# Patient Record
Sex: MALE | Race: WHITE | NOT HISPANIC OR LATINO | Employment: FULL TIME | URBAN - METROPOLITAN AREA
[De-identification: names, ages, dates, MRNs, and addresses within clinical notes are randomized per-mention and may not be internally consistent; named-entity substitution may affect disease eponyms.]

---

## 2023-04-24 ENCOUNTER — OFFICE VISIT (OUTPATIENT)
Dept: PRIMARY CARE | Facility: CLINIC | Age: 58
End: 2023-04-24
Payer: COMMERCIAL

## 2023-04-24 VITALS
HEIGHT: 72 IN | BODY MASS INDEX: 25.06 KG/M2 | SYSTOLIC BLOOD PRESSURE: 134 MMHG | WEIGHT: 185 LBS | TEMPERATURE: 96.8 F | DIASTOLIC BLOOD PRESSURE: 80 MMHG

## 2023-04-24 DIAGNOSIS — Z12.5 PROSTATE CANCER SCREENING: ICD-10-CM

## 2023-04-24 DIAGNOSIS — I10 PRIMARY HYPERTENSION: Primary | ICD-10-CM

## 2023-04-24 PROCEDURE — 3075F SYST BP GE 130 - 139MM HG: CPT | Performed by: FAMILY MEDICINE

## 2023-04-24 PROCEDURE — 3079F DIAST BP 80-89 MM HG: CPT | Performed by: FAMILY MEDICINE

## 2023-04-24 PROCEDURE — 99213 OFFICE O/P EST LOW 20 MIN: CPT | Performed by: FAMILY MEDICINE

## 2023-04-24 PROCEDURE — 1036F TOBACCO NON-USER: CPT | Performed by: FAMILY MEDICINE

## 2023-04-24 RX ORDER — FERROUS SULFATE 325(65) MG
65 TABLET ORAL
COMMUNITY

## 2023-04-24 RX ORDER — LISINOPRIL 10 MG/1
10 TABLET ORAL DAILY
Qty: 90 TABLET | Refills: 3 | Status: SHIPPED | OUTPATIENT
Start: 2023-04-24 | End: 2024-05-14 | Stop reason: SDUPTHER

## 2023-04-24 RX ORDER — MULTIVITAMIN
1 TABLET ORAL DAILY
COMMUNITY

## 2023-04-24 RX ORDER — LISINOPRIL 10 MG/1
1 TABLET ORAL DAILY
COMMUNITY
Start: 2019-11-04 | End: 2023-04-24 | Stop reason: SDUPTHER

## 2023-04-24 RX ORDER — VIT C/E/ZN/COPPR/LUTEIN/ZEAXAN 250MG-90MG
25 CAPSULE ORAL DAILY
COMMUNITY

## 2023-04-24 ASSESSMENT — PATIENT HEALTH QUESTIONNAIRE - PHQ9
1. LITTLE INTEREST OR PLEASURE IN DOING THINGS: NOT AT ALL
SUM OF ALL RESPONSES TO PHQ9 QUESTIONS 1 AND 2: 0
2. FEELING DOWN, DEPRESSED OR HOPELESS: NOT AT ALL

## 2023-04-24 ASSESSMENT — ENCOUNTER SYMPTOMS: DEPRESSION: 0

## 2023-04-24 NOTE — PROGRESS NOTES
Subjective   Patient ID: [id number]     Darryl Berry is a 57 y.o. male who presents for Follow-up and Med Refill.  HPI  He is here for a recheck on medications.  He is on lisinopril 10 mg daily.   No side effects.  No cough.  No dizziness.    He has not been in in about two years but he has felt well.    No chest pain.  No SOB.    No fatigue.      BP today is 140/80.    Objective     /80 (BP Location: Right arm, Patient Position: Sitting)   Temp 36 °C (96.8 °F) (Skin)   Ht 1.829 m (6')   Wt 83.9 kg (185 lb)   BMI 25.09 kg/m²      Physical Exam  Cardiovascular:      Rate and Rhythm: Regular rhythm.      Heart sounds: Normal heart sounds.   Pulmonary:      Effort: Pulmonary effort is normal.      Breath sounds: Normal breath sounds.   Neurological:      General: No focal deficit present.      Mental Status: He is alert and oriented to person, place, and time.   Psychiatric:         Mood and Affect: Mood normal.         Behavior: Behavior normal.         Assessment/Plan   Problem List Items Addressed This Visit    None  Visit Diagnoses       Primary hypertension    -  Primary    Relevant Medications    lisinopril 10 mg tablet    Other Relevant Orders    Urinalysis with Reflex Microscopic    Hemoglobin A1C    Lipid Panel    Thyroid Stimulating Hormone    Comprehensive Metabolic Panel    CBC and Auto Differential    Prostate cancer screening        Relevant Orders    Prostate Specific Antigen          I refilled your medication.  I ordered labs to be done fasting at [address] in [place].  I do recommend that you get a colonoscopy to screen for and possibly prevent colon cancer.  You decline this today.  Let me know if you change your mind.    Ralph Sher, DO

## 2023-04-24 NOTE — PATIENT INSTRUCTIONS
Appointment is Already Scheduled for 8/16 I refilled your medication.  I ordered labs to be done fasting at [address] in [place].  I do recommend that you get a colonoscopy to screen for and possibly prevent colon cancer.  You decline this today.  Let me know if you change your mind.

## 2023-04-25 ENCOUNTER — LAB (OUTPATIENT)
Dept: LAB | Facility: LAB | Age: 58
End: 2023-04-25
Payer: COMMERCIAL

## 2023-04-25 DIAGNOSIS — I10 PRIMARY HYPERTENSION: ICD-10-CM

## 2023-04-25 DIAGNOSIS — Z12.5 PROSTATE CANCER SCREENING: ICD-10-CM

## 2023-04-25 LAB
ALANINE AMINOTRANSFERASE (SGPT) (U/L) IN SER/PLAS: 28 U/L (ref 10–52)
ALBUMIN (G/DL) IN SER/PLAS: 4.4 G/DL (ref 3.4–5)
ALKALINE PHOSPHATASE (U/L) IN SER/PLAS: 60 U/L (ref 33–120)
ANION GAP IN SER/PLAS: 11 MMOL/L (ref 10–20)
APPEARANCE, URINE: CLEAR
ASPARTATE AMINOTRANSFERASE (SGOT) (U/L) IN SER/PLAS: 22 U/L (ref 9–39)
BASOPHILS (10*3/UL) IN BLOOD BY AUTOMATED COUNT: 0.05 X10E9/L (ref 0–0.1)
BASOPHILS/100 LEUKOCYTES IN BLOOD BY AUTOMATED COUNT: 1.5 % (ref 0–2)
BILIRUBIN TOTAL (MG/DL) IN SER/PLAS: 0.6 MG/DL (ref 0–1.2)
BILIRUBIN, URINE: NEGATIVE
BLOOD, URINE: NEGATIVE
CALCIUM (MG/DL) IN SER/PLAS: 9.5 MG/DL (ref 8.6–10.3)
CARBON DIOXIDE, TOTAL (MMOL/L) IN SER/PLAS: 30 MMOL/L (ref 21–32)
CHLORIDE (MMOL/L) IN SER/PLAS: 103 MMOL/L (ref 98–107)
CHOLESTEROL (MG/DL) IN SER/PLAS: 149 MG/DL (ref 0–199)
CHOLESTEROL IN HDL (MG/DL) IN SER/PLAS: 47.7 MG/DL
CHOLESTEROL/HDL RATIO: 3.1
COLOR, URINE: COLORLESS
CREATININE (MG/DL) IN SER/PLAS: 0.96 MG/DL (ref 0.5–1.3)
EOSINOPHILS (10*3/UL) IN BLOOD BY AUTOMATED COUNT: 0.18 X10E9/L (ref 0–0.7)
EOSINOPHILS/100 LEUKOCYTES IN BLOOD BY AUTOMATED COUNT: 5.3 % (ref 0–6)
ERYTHROCYTE DISTRIBUTION WIDTH (RATIO) BY AUTOMATED COUNT: 11.9 % (ref 11.5–14.5)
ERYTHROCYTE MEAN CORPUSCULAR HEMOGLOBIN CONCENTRATION (G/DL) BY AUTOMATED: 33 G/DL (ref 32–36)
ERYTHROCYTE MEAN CORPUSCULAR VOLUME (FL) BY AUTOMATED COUNT: 89 FL (ref 80–100)
ERYTHROCYTES (10*6/UL) IN BLOOD BY AUTOMATED COUNT: 4.92 X10E12/L (ref 4.5–5.9)
ESTIMATED AVERAGE GLUCOSE FOR HBA1C: 100 MG/DL
GFR MALE: >90 ML/MIN/1.73M2
GLUCOSE (MG/DL) IN SER/PLAS: 86 MG/DL (ref 74–99)
GLUCOSE, URINE: NEGATIVE MG/DL
HEMATOCRIT (%) IN BLOOD BY AUTOMATED COUNT: 43.9 % (ref 41–52)
HEMOGLOBIN (G/DL) IN BLOOD: 14.5 G/DL (ref 13.5–17.5)
HEMOGLOBIN A1C/HEMOGLOBIN TOTAL IN BLOOD: 5.1 %
IMMATURE GRANULOCYTES/100 LEUKOCYTES IN BLOOD BY AUTOMATED COUNT: 0 % (ref 0–0.9)
KETONES, URINE: NEGATIVE MG/DL
LDL: 86 MG/DL (ref 0–99)
LEUKOCYTE ESTERASE, URINE: NEGATIVE
LEUKOCYTES (10*3/UL) IN BLOOD BY AUTOMATED COUNT: 3.4 X10E9/L (ref 4.4–11.3)
LYMPHOCYTES (10*3/UL) IN BLOOD BY AUTOMATED COUNT: 0.87 X10E9/L (ref 1.2–4.8)
LYMPHOCYTES/100 LEUKOCYTES IN BLOOD BY AUTOMATED COUNT: 25.8 % (ref 13–44)
MONOCYTES (10*3/UL) IN BLOOD BY AUTOMATED COUNT: 0.34 X10E9/L (ref 0.1–1)
MONOCYTES/100 LEUKOCYTES IN BLOOD BY AUTOMATED COUNT: 10.1 % (ref 2–10)
NEUTROPHILS (10*3/UL) IN BLOOD BY AUTOMATED COUNT: 1.93 X10E9/L (ref 1.2–7.7)
NEUTROPHILS/100 LEUKOCYTES IN BLOOD BY AUTOMATED COUNT: 57.3 % (ref 40–80)
NITRITE, URINE: NEGATIVE
PH, URINE: 8 (ref 5–8)
PLATELETS (10*3/UL) IN BLOOD AUTOMATED COUNT: 196 X10E9/L (ref 150–450)
POTASSIUM (MMOL/L) IN SER/PLAS: 4.2 MMOL/L (ref 3.5–5.3)
PROSTATE SPECIFIC AG (NG/ML) IN SER/PLAS: 0.34 NG/ML (ref 0–4)
PROTEIN TOTAL: 7.7 G/DL (ref 6.4–8.2)
PROTEIN, URINE: NEGATIVE MG/DL
SODIUM (MMOL/L) IN SER/PLAS: 140 MMOL/L (ref 136–145)
SPECIFIC GRAVITY, URINE: 1 (ref 1–1.03)
THYROTROPIN (MIU/L) IN SER/PLAS BY DETECTION LIMIT <= 0.05 MIU/L: 2.16 MIU/L (ref 0.44–3.98)
TRIGLYCERIDE (MG/DL) IN SER/PLAS: 76 MG/DL (ref 0–149)
UREA NITROGEN (MG/DL) IN SER/PLAS: 18 MG/DL (ref 6–23)
UROBILINOGEN, URINE: <2 MG/DL (ref 0–1.9)
VLDL: 15 MG/DL (ref 0–40)

## 2023-04-25 PROCEDURE — 85025 COMPLETE CBC W/AUTO DIFF WBC: CPT

## 2023-04-25 PROCEDURE — 80053 COMPREHEN METABOLIC PANEL: CPT

## 2023-04-25 PROCEDURE — 83036 HEMOGLOBIN GLYCOSYLATED A1C: CPT

## 2023-04-25 PROCEDURE — 81003 URINALYSIS AUTO W/O SCOPE: CPT

## 2023-04-25 PROCEDURE — 84153 ASSAY OF PSA TOTAL: CPT

## 2023-04-25 PROCEDURE — 84443 ASSAY THYROID STIM HORMONE: CPT

## 2023-04-25 PROCEDURE — 80061 LIPID PANEL: CPT

## 2023-04-25 PROCEDURE — 36415 COLL VENOUS BLD VENIPUNCTURE: CPT

## 2023-04-26 ENCOUNTER — TELEPHONE (OUTPATIENT)
Dept: PRIMARY CARE | Facility: CLINIC | Age: 58
End: 2023-04-26
Payer: COMMERCIAL

## 2023-04-26 NOTE — TELEPHONE ENCOUNTER
----- Message from Ralph Sher DO sent at 4/26/2023  8:04 AM EDT -----  Please let him know his labs showed no significant abnormalities.

## 2024-05-14 ENCOUNTER — OFFICE VISIT (OUTPATIENT)
Dept: PRIMARY CARE | Facility: CLINIC | Age: 59
End: 2024-05-14
Payer: COMMERCIAL

## 2024-05-14 VITALS
DIASTOLIC BLOOD PRESSURE: 70 MMHG | SYSTOLIC BLOOD PRESSURE: 120 MMHG | WEIGHT: 176 LBS | HEIGHT: 72 IN | BODY MASS INDEX: 23.84 KG/M2 | TEMPERATURE: 97.7 F

## 2024-05-14 DIAGNOSIS — K40.90 RIGHT INGUINAL HERNIA: ICD-10-CM

## 2024-05-14 DIAGNOSIS — I10 PRIMARY HYPERTENSION: Primary | ICD-10-CM

## 2024-05-14 PROCEDURE — 3078F DIAST BP <80 MM HG: CPT | Performed by: FAMILY MEDICINE

## 2024-05-14 PROCEDURE — 1036F TOBACCO NON-USER: CPT | Performed by: FAMILY MEDICINE

## 2024-05-14 PROCEDURE — 99214 OFFICE O/P EST MOD 30 MIN: CPT | Performed by: FAMILY MEDICINE

## 2024-05-14 PROCEDURE — 3074F SYST BP LT 130 MM HG: CPT | Performed by: FAMILY MEDICINE

## 2024-05-14 RX ORDER — LISINOPRIL 10 MG/1
10 TABLET ORAL DAILY
Qty: 90 TABLET | Refills: 3 | Status: SHIPPED | OUTPATIENT
Start: 2024-05-14

## 2024-05-14 RX ORDER — ASCORBIC ACID 500 MG
500 TABLET ORAL DAILY
COMMUNITY

## 2024-05-14 ASSESSMENT — ENCOUNTER SYMPTOMS
SHORTNESS OF BREATH: 0
DIZZINESS: 0
FEVER: 0
VOMITING: 0
NAUSEA: 0
CONSTIPATION: 0
COUGH: 0
ABDOMINAL PAIN: 0
FATIGUE: 0
DIARRHEA: 0
DYSURIA: 0

## 2024-05-14 ASSESSMENT — PATIENT HEALTH QUESTIONNAIRE - PHQ9
SUM OF ALL RESPONSES TO PHQ9 QUESTIONS 1 AND 2: 0
1. LITTLE INTEREST OR PLEASURE IN DOING THINGS: NOT AT ALL
2. FEELING DOWN, DEPRESSED OR HOPELESS: NOT AT ALL

## 2024-05-14 NOTE — PATIENT INSTRUCTIONS
I refilled your lisinopril.  I ordered a general surgery referral to treat your right inguinal hernia.  I recommend no lifting over twenty pounds.  Return or go to the ER if you have increased pain at the hernia, fevers, vomiting with constipation or any concerning symptom associated with the right inguinal hernia.

## 2024-05-14 NOTE — PROGRESS NOTES
Subjective   Patient ID: [id number]     Darryl Berry is a 58 y.o. male who presents for Follow-up (Blood pressure check.), Med Refill, and Mass (Right groin area.).  HPI  He is here for a recheck.  He is on lisinopril for high blood pressure.      BP today is 120/70.      He complains of a mass in his right groin area. He has had a scrotal ultrasound in 2021 showing a moderate right hydrocele and several epididymal cysts.    He has had a chest cold with coughing.  It was bad for two days two weeks ago.  It has improved.  He had some bad coughing and he has felt a buldge in the right inguinal area and he is concerned about a hernia.  No pain, just a bulge out of place.    Review of Systems   Constitutional:  Negative for fatigue and fever.   Respiratory:  Negative for cough and shortness of breath.    Cardiovascular:  Negative for chest pain.   Gastrointestinal:  Negative for abdominal pain, constipation, diarrhea, nausea and vomiting.   Genitourinary:  Negative for dysuria, scrotal swelling and testicular pain.   Neurological:  Negative for dizziness.                 Objective     /70 (BP Location: Left arm, Patient Position: Sitting)   Temp 36.5 °C (97.7 °F) (Skin)   Ht 1.829 m (6')   Wt 79.8 kg (176 lb)   BMI 23.87 kg/m²      Physical Exam  Constitutional:       Appearance: Normal appearance.   Cardiovascular:      Rate and Rhythm: Normal rate and regular rhythm.      Heart sounds: Normal heart sounds. No murmur heard.  Pulmonary:      Effort: Pulmonary effort is normal. No respiratory distress.      Breath sounds: Normal breath sounds.   Abdominal:      General: Abdomen is flat. Bowel sounds are normal.      Palpations: Abdomen is soft.      Tenderness: There is no abdominal tenderness. There is no guarding or rebound.      Hernia: A hernia (right inguinal hernia.  soft.  nontender.  protrudes with cough.) is present.   Genitourinary:     Comments: No testicle mass or tenderness.  Neurological:       General: No focal deficit present.      Mental Status: He is alert and oriented to person, place, and time.         Assessment/Plan   Problem List Items Addressed This Visit    None  Visit Diagnoses       Primary hypertension    -  Primary    Right inguinal hernia            I refilled your lisinopril.  I ordered a general surgery referral to treat your right inguinal hernia.  I recommend no lifting over twenty pounds.  Return or go to the ER if you have increased pain at the hernia, fevers, vomiting with constipation or any concerning symptom associated with the right inguinal hernia.      Ralph Sher, DO

## 2024-05-16 ENCOUNTER — OFFICE VISIT (OUTPATIENT)
Dept: SURGERY | Facility: CLINIC | Age: 59
End: 2024-05-16
Payer: COMMERCIAL

## 2024-05-16 VITALS
BODY MASS INDEX: 23.76 KG/M2 | DIASTOLIC BLOOD PRESSURE: 83 MMHG | OXYGEN SATURATION: 100 % | SYSTOLIC BLOOD PRESSURE: 170 MMHG | HEART RATE: 69 BPM | HEIGHT: 72 IN | RESPIRATION RATE: 16 BRPM | WEIGHT: 175.4 LBS | TEMPERATURE: 96 F

## 2024-05-16 DIAGNOSIS — K40.90 RIGHT INGUINAL HERNIA: Primary | ICD-10-CM

## 2024-05-16 PROCEDURE — 99204 OFFICE O/P NEW MOD 45 MIN: CPT | Performed by: SURGERY

## 2024-05-16 RX ORDER — UBIDECARENONE 75 MG
500 CAPSULE ORAL DAILY
COMMUNITY

## 2024-05-16 NOTE — PROGRESS NOTES
Darryl WAY Berry   [id number]   1965         Chief Complaint/      Right inguinal hernia            HPI/    58-year-old male seen for right inguinal hernia    Patient does not know how long he has had the hernia but has noticed it over the last several months.  Says the bulge in his right groin that pops out and extends into his scrotum.  He can usually push it without difficulty    Denies any issues on the left    Past Medical History:   Diagnosis Date    Hypertension 2020      Hypertension    Denies diabetes, coronary disease, stroke      Social History     Socioeconomic History    Marital status:      Spouse name: Not on file    Number of children: Not on file    Years of education: Not on file    Highest education level: Not on file   Occupational History    Not on file   Tobacco Use    Smoking status: Never    Smokeless tobacco: Never   Vaping Use    Vaping status: Never Used   Substance and Sexual Activity    Alcohol use: Yes     Comment: No regular weekly consumption. Several beers a month    Drug use: Never    Sexual activity: Yes     Partners: Female     Birth control/protection: Condom Male, Male Sterilization   Other Topics Concern    Not on file   Social History Narrative    Not on file     Social Determinants of Health     Financial Resource Strain: Not on file   Food Insecurity: Not on file   Transportation Needs: Not on file   Physical Activity: Not on file   Stress: Not on file   Social Connections: Not on file   Intimate Partner Violence: Not on file   Housing Stability: Not on file   Non-smoker      Family History   Problem Relation Name Age of Onset    No Known Problems Mother      No Known Problems Father          Review of Systems       Current Outpatient Medications:     ascorbic acid (Vitamin C) 500 mg tablet, Take 1 tablet (500 mg) by mouth once daily., Disp: , Rfl:     cholecalciferol (Vitamin D-3) 25 MCG (1000 UT) capsule, Take 1 capsule (25 mcg) by mouth once daily., Disp: , Rfl:      ferrous sulfate 325 (65 Fe) MG tablet, Take 1 tablet by mouth once daily with breakfast., Disp: , Rfl:     lisinopril 10 mg tablet, Take 1 tablet (10 mg) by mouth once daily., Disp: 90 tablet, Rfl: 3    multivitamin tablet, Take 1 tablet by mouth once daily., Disp: , Rfl:     cyanocobalamin (Vitamin B-12) 500 mcg tablet, Take 1 tablet (500 mcg) by mouth once daily., Disp: , Rfl:        no      Xaralto  no      Eliquis  no      Coumadin  no      Plavix        No Known Allergies     US SCROTUM WITH DOPPLER  MRN: [id number]  Patient Name: SHANNA FARMER     STUDY:  US SCROTUM WITH DOPPLERS;  10/18/2021 5:41 pm     INDICATION:  right testicle posterior fullness and pain vs epididymis fullness and  pain.  N50.819: Persistent pain in testicle.     COMPARISON:  None.     ACCESSION NUMBER(S):  [id number]     ORDERING CLINICIAN:  BRYANT GONZALEZ     TECHNIQUE:  Multiple ultrasonographic images of scrotum and tested were obtained.  Color Doppler imaging and waveform analysis was also performed.     FINDINGS:  RIGHT HEMISCROTUM:     RIGHT TESTICLE:  The right testicle measures 4.2 x 2.2 x 3.2 cm.  The right testicle  demonstrates a homogeneous echotexture and normal contour.   There is  a moderate hydrocele with low-level echogenicity indicating debris.  Normal vascularity and Doppler waveforms are observed in the right  testicle.     RIGHT EPIDIDYMIS:  The right epididymis measures 1 x 1.1 x 1.2cm , with an approximate 3  mm epididymal cyst.     LEFT HEMISCROTUM:     LEFT TESTICLE:  The left testicle measures 5.1 x 2.8 x 4 cm.  The left testicle  demonstrates a homogeneous echotexture and normal contour.  There is  a small hydrocele, also with some debris. Normal vascularity and  Doppler waveforms are observed in the left testicle.     LEFT EPIDIDYMIS:  The left epididymis measures 1.2 x 1.1 x 1.3cm , with 2 up to 4 x 4  mm cyst at the epididymal head.     Pampiniform plexus:  No evidence of varicoceles      IMPRESSION:  Moderate right and small left hydroceles containing debris.  Several epididymal cysts. Otherwise unremarkable.       I have reviewed the images and the reports      Laboratory data:   CBC:   Lab Results   Component Value Date    WBC 3.4 (L) 04/25/2023    RBC 4.92 04/25/2023    HGB 14.5 04/25/2023    HCT 43.9 04/25/2023     04/25/2023   ]   [facility] Labs:   Lab Results   Component Value Date     04/25/2023    K 4.2 04/25/2023     04/25/2023    CO2 30 04/25/2023    BUN 18 04/25/2023    CREATININE 0.96 04/25/2023    CALCIUM 9.5 04/25/2023    PROT 7.7 04/25/2023    ALBUMIN 4.4 04/25/2023    BILITOT 0.6 04/25/2023    ALKPHOS 60 04/25/2023    AST 22 04/25/2023    ALT 28 04/25/2023          I have reviewed the above laboratory studies      /83 (BP Location: Right arm, Patient Position: Sitting, BP Cuff Size: Adult)   Pulse 69   Temp 35.6 °C (96 °F) (Temporal)   Resp 16   Ht 1.829 m (6')   Wt 79.6 kg (175 lb 6.4 oz)   SpO2 100%   BMI 23.79 kg/m²        Physical Exam  Constitutional:       Appearance: Normal appearance.   HENT:      Head: Normocephalic and atraumatic.   Cardiovascular:      Rate and Rhythm: Normal rate and regular rhythm.   Pulmonary:      Effort: Pulmonary effort is normal.      Breath sounds: Normal breath sounds.   Abdominal:      Comments: Right inguinal hernia noted, the visible, extends into the upper scrotum, reduces    Mild swelling of right testicle noted    No left inguinal hernia   Musculoskeletal:      Cervical back: Normal range of motion.   Skin:     General: Skin is warm.   Neurological:      General: No focal deficit present.      Mental Status: He is alert and oriented to person, place, and time.                  Assessment/    Right inguinal hernia    Right hydrocele    Baseline health issues to include hypertension          Plan/    Patient has 2 issues in the right groin, 1 being the hernia and the second being the hydrocele.    Patient states  that the hernia is becoming symptomatic and he wants to have it fixed.  He denies any testicular pain.    I told the patient that we could see urology.  If hydrocelectomy or other treatments needed could be accomplished at the same time as hernia repair.  Patient states he does not want to have any testicular surgery done as it is not hurting him.  He does want to have the hernia fixed    Plan will be to proceed to open repair of right inguinal hernia.  Again the hydrocele will not be addressed at this point in time per his request    Open repair of the inguinal hernia is reviewed.    The potential of bleeding, infection, MI, PE/DVT, death, etc. reviewed.  The anticipated convalescence is reviewed.  Use of mesh and prosthetic materials is reviewed.  The plan for postoperative pain management is reviewed.  All questions were answered and consent was obtained

## 2024-06-12 ENCOUNTER — LAB (OUTPATIENT)
Dept: LAB | Facility: LAB | Age: 59
End: 2024-06-12
Payer: COMMERCIAL

## 2024-06-12 ENCOUNTER — PRE-ADMISSION TESTING (OUTPATIENT)
Dept: PREADMISSION TESTING | Facility: HOSPITAL | Age: 59
End: 2024-06-12
Payer: COMMERCIAL

## 2024-06-12 VITALS
SYSTOLIC BLOOD PRESSURE: 141 MMHG | WEIGHT: 171.3 LBS | TEMPERATURE: 97.7 F | DIASTOLIC BLOOD PRESSURE: 82 MMHG | BODY MASS INDEX: 23.2 KG/M2 | HEIGHT: 72 IN | RESPIRATION RATE: 16 BRPM | OXYGEN SATURATION: 100 % | HEART RATE: 73 BPM

## 2024-06-12 DIAGNOSIS — Z01.818 PRE-OP TESTING: Primary | ICD-10-CM

## 2024-06-12 DIAGNOSIS — K40.90 RIGHT INGUINAL HERNIA: ICD-10-CM

## 2024-06-12 LAB
ANION GAP SERPL CALC-SCNC: 11 MMOL/L (ref 10–20)
BUN SERPL-MCNC: 20 MG/DL (ref 6–23)
CALCIUM SERPL-MCNC: 9.7 MG/DL (ref 8.6–10.3)
CHLORIDE SERPL-SCNC: 103 MMOL/L (ref 98–107)
CO2 SERPL-SCNC: 31 MMOL/L (ref 21–32)
CREAT SERPL-MCNC: 0.91 MG/DL (ref 0.5–1.3)
EGFRCR SERPLBLD CKD-EPI 2021: >90 ML/MIN/1.73M*2
ERYTHROCYTE [DISTWIDTH] IN BLOOD BY AUTOMATED COUNT: 11.9 % (ref 11.5–14.5)
GLUCOSE SERPL-MCNC: 88 MG/DL (ref 74–99)
HCT VFR BLD AUTO: 39.7 % (ref 41–52)
HGB BLD-MCNC: 13.8 G/DL (ref 13.5–17.5)
MCH RBC QN AUTO: 28.8 PG (ref 26–34)
MCHC RBC AUTO-ENTMCNC: 34.8 G/DL (ref 32–36)
MCV RBC AUTO: 83 FL (ref 80–100)
NRBC BLD-RTO: 0 /100 WBCS (ref 0–0)
PLATELET # BLD AUTO: 199 X10*3/UL (ref 150–450)
POTASSIUM SERPL-SCNC: 4.8 MMOL/L (ref 3.5–5.3)
RBC # BLD AUTO: 4.8 X10*6/UL (ref 4.5–5.9)
SODIUM SERPL-SCNC: 140 MMOL/L (ref 136–145)
WBC # BLD AUTO: 4.9 X10*3/UL (ref 4.4–11.3)

## 2024-06-12 PROCEDURE — 80048 BASIC METABOLIC PNL TOTAL CA: CPT

## 2024-06-12 PROCEDURE — 36415 COLL VENOUS BLD VENIPUNCTURE: CPT

## 2024-06-12 PROCEDURE — 93005 ELECTROCARDIOGRAM TRACING: CPT

## 2024-06-12 PROCEDURE — 85027 COMPLETE CBC AUTOMATED: CPT

## 2024-06-12 PROCEDURE — 87081 CULTURE SCREEN ONLY: CPT | Mod: STJLAB

## 2024-06-12 RX ORDER — CHLORHEXIDINE GLUCONATE ORAL RINSE 1.2 MG/ML
SOLUTION DENTAL
Qty: 473 ML | Refills: 0 | Status: SHIPPED | OUTPATIENT
Start: 2024-06-12

## 2024-06-12 ASSESSMENT — DUKE ACTIVITY SCORE INDEX (DASI)
CAN YOU CLIMB A FLIGHT OF STAIRS OR WALK UP A HILL: YES
TOTAL_SCORE: 58.2
CAN YOU DO LIGHT WORK AROUND THE HOUSE LIKE DUSTING OR WASHING DISHES: YES
CAN YOU HAVE SEXUAL RELATIONS: YES
CAN YOU WALK A BLOCK OR TWO ON LEVEL GROUND: YES
CAN YOU DO HEAVY WORK AROUND THE HOUSE LIKE SCRUBBING FLOORS OR LIFTING AND MOVING HEAVY FURNITURE: YES
DASI METS SCORE: 9.9
CAN YOU WALK INDOORS, SUCH AS AROUND YOUR HOUSE: YES
CAN YOU DO YARD WORK LIKE RAKING LEAVES, WEEDING OR PUSHING A MOWER: YES
CAN YOU RUN A SHORT DISTANCE: YES
CAN YOU PARTICIPATE IN STRENOUS SPORTS LIKE SWIMMING, SINGLES TENNIS, FOOTBALL, BASKETBALL, OR SKIING: YES
CAN YOU DO MODERATE WORK AROUND THE HOUSE LIKE VACUUMING, SWEEPING FLOORS OR CARRYING GROCERIES: YES
CAN YOU TAKE CARE OF YOURSELF (EAT, DRESS, BATHE, OR USE TOILET): YES
CAN YOU PARTICIPATE IN MODERATE RECREATIONAL ACTIVITIES LIKE GOLF, BOWLING, DANCING, DOUBLES TENNIS OR THROWING A BASEBALL OR FOOTBALL: YES

## 2024-06-12 ASSESSMENT — ACTIVITIES OF DAILY LIVING (ADL): ADL_SCORE: 0

## 2024-06-12 ASSESSMENT — LIFESTYLE VARIABLES: SMOKING_STATUS: NONSMOKER

## 2024-06-12 ASSESSMENT — PAIN - FUNCTIONAL ASSESSMENT: PAIN_FUNCTIONAL_ASSESSMENT: 0-10

## 2024-06-12 NOTE — PREPROCEDURE INSTRUCTIONS
Medication List            Accurate as of June 12, 2024  1:35 PM. Always use your most recent med list.                ascorbic acid 500 mg tablet  Commonly known as: Vitamin C  Medication Adjustments for Surgery: Stop 7 days before surgery     chlorhexidine 0.12 % solution  Commonly known as: Peridex  Swish and spit 15 ml for 2 doses, 15mL the night before surgery and 15 ml morning of surgery - swish for 30 seconds -DO NOT SWALLOW, SPIT OUT  Medication Adjustments for Surgery: Other (Comment)  Notes to patient: As indicated     cholecalciferol 25 MCG (1000 UT) capsule  Commonly known as: Vitamin D-3  Medication Adjustments for Surgery: Stop 7 days before surgery     cyanocobalamin 500 mcg tablet  Commonly known as: Vitamin B-12  Medication Adjustments for Surgery: Stop 7 days before surgery     ferrous sulfate (325 mg ferrous sulfate) tablet  Medication Adjustments for Surgery: Stop 7 days before surgery     lisinopril 10 mg tablet  Take 1 tablet (10 mg) by mouth once daily.  Medication Adjustments for Surgery: Other (Comment)  Notes to patient: HOLD any evening dose the night before the day of surgery  HOLD the day of surgery  -should be discontinued for a minimum of 24 hours before surgery     multivitamin tablet  Medication Adjustments for Surgery: Stop 7 days before surgery                                PRE-OPERATIVE INSTRUCTIONS    You will receive notification one business day prior to your procedure to confirm your arrival time. It is important that you answer your phone and/or check your messages during this time. If you do not hear from the surgery center by 5 pm. the day before your procedure, please call [phone].     Please enter the building through the Outpatient entrance and take the elevator off the lobby to the 2nd floor then check in at the Outpatient Surgery desk on the 2nd floor.    INSTRUCTIONS:  Talk to your surgeon for instructions if you should stop your aspirin, blood thinner, or  diabetes medicines.  DO NOT take any multivitamins or over the counter supplements for 7-10 days before surgery.  If not being admitted, you must have an adult immediately available to drive you home after surgery. We also highly recommend you have someone stay with you for the entire day and night of your surgery.  For children having surgery, a parent or legal guardian must accompany them to the surgery center. If this is not possible, please call [phone] to make additional arrangements.  For adults who are unable to consent or make medical decisions for themselves, a legal guardian or Power of  must accompany them to the surgery center. If this is not possible, please call [phone] to make additional arrangements.  Wear comfortable, loose fitting clothing.  All jewelry and piercings must be removed. If you are unable to remove an item or have a dermal piercing, please be sure to tell the nurse when you arrive for surgery.  Nail polish and make-up must be removed.  Avoid smoking or consuming alcohol for 24 hours before surgery.  To help prevent infection, please take a shower/bath and wash your hair the night before and/or morning of surgery (or follow other specific bathing instructions provided).    Preoperative Fasting Guidelines    Why must I stop eating and drinking near surgery time?  With sedation, food or liquid in your stomach can enter your lungs causing serious complications  Increases nausea and vomiting    When do I need to stop eating and drinking before my surgery?  Do not eat any solid food after midnight the night before your surgery/procedure unless otherwise instructed by your surgeon.   You may have up to 13.5 ounces of clear liquid until TWO hours before your instructed arrival time to the hospital.  This includes water, black tea/coffee, (no milk or cream) apple juice, and electrolyte drinks (Gatorade).   You may chew gum until TWO hours before your surgery/procedure      If  applicable, notify your surgeons office immediately of any new skin changes that occur to the surgical limb.      If you have any questions or concerns, please call Pre-Admission Testing at [phone].

## 2024-06-12 NOTE — CPM/PAT H&P
CPM/PAT Evaluation       Name: Darryl Berry (Darryl Berry)  /Age: 1965/58 y.o.     In-Person       Chief Complaint: right inguinal hernia    HPI    PM is a 57 yo male who has noticed a right groin bulge over the last several months. Symptoms of discomfort with working out are occurring and the bulge pops out and extends into his scrotum, it can be pushed back in without issues. Once he was seen by general surgery it was decided that he would be scheduled for right inguinal hernia repair. He denies any bruising or acute pains from the region. Otherwise denies any recent illness, fever/chills, chest pains or shortness of breath.     Past Medical History:   Diagnosis Date    Hydrocele     Hypertension     Inguinal hernia      PCP: Dr. Tang     Past Surgical History:   Procedure Laterality Date    VASECTOMY  2019    WISDOM TOOTH EXTRACTION         Patient  reports being sexually active and has had partner(s) who are female. He reports using the following methods of birth control/protection: Condom Male and Male Sterilization.    Family History   Problem Relation Name Age of Onset    Hypertension Mother      Hypertension Father      Hypertension Brother         No Known Allergies    Prior to Admission medications    Medication Sig Start Date End Date Taking? Authorizing Provider   ascorbic acid (Vitamin C) 500 mg tablet Take 1 tablet (500 mg) by mouth once daily.    Historical Provider, MD   cholecalciferol (Vitamin D-3) 25 MCG (1000 UT) capsule Take 1 capsule (25 mcg) by mouth once daily.    Historical Provider, MD   cyanocobalamin (Vitamin B-12) 500 mcg tablet Take 1 tablet (500 mcg) by mouth once daily.    Historical Provider, MD   ferrous sulfate 325 (65 Fe) MG tablet Take 1 tablet by mouth once daily with breakfast.    Historical Provider, MD   lisinopril 10 mg tablet Take 1 tablet (10 mg) by mouth once daily. 24   Ralph Sher DO   multivitamin tablet Take 1 tablet by mouth once  daily.    Historical Provider, MD GIBSON BASILIO   Constitutional: Negative for fever, chills, or sweats   ENMT: Negative for nasal discharge, congestion, ear pain, mouth pain, throat pain   Respiratory: Negative for cough, wheezing, shortness of breath   Cardiac: Negative for chest pain, dyspnea on exertion, palpitations   Gastrointestinal: Negative for nausea, vomiting, diarrhea, constipation, abdominal pain    Genitourinary: Negative for dysuria, flank pain, frequency, hematuria + right groin bulge from hernia with swelling into scrotum    Musculoskeletal: Negative for decreased ROM, pain, swelling, weakness   Neurological: Negative for dizziness, confusion, headache  Psychiatric: Negative for mood changes   Skin: Negative for itching, rash, ulcer    Hematologic/Lymph: Negative for bruising, easy bleeding  Allergic/Immunologic: Negative itching, sneezing, swelling      Physical Exam  Constitutional:       Appearance: Normal appearance.   HENT:      Head: Normocephalic.      Mouth/Throat:      Mouth: Mucous membranes are moist.   Eyes:      Extraocular Movements: Extraocular movements intact.   Cardiovascular:      Rate and Rhythm: Normal rate and regular rhythm.   Pulmonary:      Effort: Pulmonary effort is normal.      Breath sounds: Normal breath sounds.   Abdominal:      General: Abdomen is flat.      Palpations: Abdomen is soft.      Hernia: A hernia is present. Hernia is present in the right inguinal area.   Musculoskeletal:         General: Normal range of motion.      Cervical back: Normal range of motion.   Skin:     General: Skin is warm and dry.   Neurological:      General: No focal deficit present.      Mental Status: He is alert.   Psychiatric:         Mood and Affect: Mood normal.          PAT AIRWAY:   Airway:     Neck ROM::  Full   Front tooth implant   implants    Anesthesia:  Patient has not had general anesthesia     Visit Vitals  /82   Pulse 73   Temp 36.5 °C (97.7 °F) (Temporal)   Resp  16       DASI Risk Score      Flowsheet Row Most Recent Value   DASI SCORE 58.2   METS Score (Will be calculated only when all the questions are answered) 9.9          Caprini DVT Assessment      Flowsheet Row Most Recent Value   DVT Score 4   Current Status Major surgery planned, including arthroscopic and laproscopic (1-2 hours)   Age 40-59 years   BMI 30 or less          Modified Frailty Index      Flowsheet Row Most Recent Value   Modified Frailty Index Calculator .0909          CHADS2 Stroke Risk  Current as of 2 hours ago        N/A 3 to 100%: High Risk   2 to < 3%: Medium Risk   0 to < 2%: Low Risk     Last Change: N/A          This score determines the patient's risk of having a stroke if the patient has atrial fibrillation.        This score is not applicable to this patient. Components are not calculated.          Revised Cardiac Risk Index    No data to display       Apfel Simplified Score    No data to display       Risk Analysis Index Results This Encounter         6/12/2024  1329             SCHAEFFER Cancer History: Patient does not indicate history of cancer    Total Risk Analysis Index Score Without Cancer: 19    Total Risk Analysis Index Score: 19          Stop Bang Score      Flowsheet Row Most Recent Value   Do you snore loudly? 0   Do you often feel tired or fatigued after your sleep? 0   Has anyone ever observed you stop breathing in your sleep? 0   Do you have or are you being treated for high blood pressure? 1   Recent BMI (Calculated) 23.2   Is BMI greater than 35 kg/m2? 0=No   Age older than 50 years old? 1=Yes   Is your neck circumference greater than 17 inches (Male) or 16 inches (Female)? 0   Gender - Male 1=Yes   STOP-BANG Total Score 3            Assessment and Plan:     58-year-old male scheduled for repair of right inguinal hernia on 6/26/2024 with Dr. Holcomb. Blood work and MRSA ordered- oral chlorahexidine prescribed. EKG shows NSR with v rate of 67 bpm. Otherwise no further orders  indicated.     Anesthesia:  Patient has not had general anesthesia     See risk scores as previously documented.

## 2024-06-14 LAB — STAPHYLOCOCCUS SPEC CULT: NORMAL

## 2024-06-15 LAB
ATRIAL RATE: 67 BPM
P AXIS: 75 DEGREES
P OFFSET: 188 MS
P ONSET: 132 MS
PR INTERVAL: 160 MS
Q ONSET: 212 MS
QRS COUNT: 11 BEATS
QRS DURATION: 108 MS
QT INTERVAL: 390 MS
QTC CALCULATION(BAZETT): 412 MS
QTC FREDERICIA: 404 MS
R AXIS: 85 DEGREES
T AXIS: 64 DEGREES
T OFFSET: 407 MS
VENTRICULAR RATE: 67 BPM

## 2024-06-24 RX ORDER — OXYCODONE AND ACETAMINOPHEN 5; 325 MG/1; MG/1
1 TABLET ORAL EVERY 6 HOURS PRN
Qty: 10 TABLET | Refills: 0 | Status: SHIPPED | OUTPATIENT
Start: 2024-06-24

## 2024-06-24 NOTE — H&P
Darryl Berry   [id number]   1965         Chief Complaint/        Right inguinal hernia          HPI/    Hypertension    Denies diabetes or coronary artery disease    Past Medical History:   Diagnosis Date    Hydrocele     Hypertension 2020    Inguinal hernia             Social History     Socioeconomic History    Marital status:      Spouse name: Not on file    Number of children: Not on file    Years of education: Not on file    Highest education level: Not on file   Occupational History    Not on file   Tobacco Use    Smoking status: Never    Smokeless tobacco: Never   Vaping Use    Vaping status: Never Used   Substance and Sexual Activity    Alcohol use: Yes     Comment: No regular weekly consumption. Several beers a month    Drug use: Never    Sexual activity: Yes     Partners: Female     Birth control/protection: Condom Male, Male Sterilization   Other Topics Concern    Not on file   Social History Narrative    Not on file     Social Determinants of Health     Financial Resource Strain: Not on file   Food Insecurity: Not on file   Transportation Needs: Not on file   Physical Activity: Not on file   Stress: Not on file   Social Connections: Not on file   Intimate Partner Violence: Not on file   Housing Stability: Not on file      Non-smoker    Family History   Problem Relation Name Age of Onset    Hypertension Mother      Hypertension Father      Hypertension Brother          Review of Systems   All other systems reviewed and are negative.       No current facility-administered medications for this encounter.    Current Outpatient Medications:     ascorbic acid (Vitamin C) 500 mg tablet, Take 1 tablet (500 mg) by mouth once daily., Disp: , Rfl:     chlorhexidine (Peridex) 0.12 % solution, Swish and spit 15 ml for 2 doses, 15mL the night before surgery and 15 ml morning of surgery - swish for 30 seconds -DO NOT SWALLOW, SPIT OUT, Disp: 473 mL, Rfl: 0    cholecalciferol (Vitamin D-3) 25 MCG (1000 UT)  "capsule, Take 1 capsule (25 mcg) by mouth once daily., Disp: , Rfl:     cyanocobalamin (Vitamin B-12) 500 mcg tablet, Take 1 tablet (500 mcg) by mouth once daily., Disp: , Rfl:     ferrous sulfate 325 (65 Fe) MG tablet, Take 1 tablet by mouth once daily with breakfast., Disp: , Rfl:     lisinopril 10 mg tablet, Take 1 tablet (10 mg) by mouth once daily., Disp: 90 tablet, Rfl: 3    multivitamin tablet, Take 1 tablet by mouth once daily., Disp: , Rfl:        no      Xaralto  no      Eliquis  no      Coumadin  no      Plavix        No Known Allergies     ECG 12 lead (Ancillary Performed)  Normal sinus rhythm  Normal ECG  No previous ECGs available       I have reviewed the images and the reports      Laboratory data:   CBC:   No results found for: \"WBC\", \"RBC\", \"HGB\", \"HCT\", \"PLT\"]   [facility] Labs:   No results found for: \"GLUF\", \"NA\", \"K\", \"CL\", \"CO2\", \"BUN\", \"CREATININE\", \"CALCIUM\", \"PROT\", \"ALBUMIN\", \"BILITOT\", \"BILIDIR\", \"ALKPHOS\", \"AST\", \"ALT\"       I have reviewed the above laboratory studies      There were no vitals taken for this visit.       Physical Exam  Constitutional:       Appearance: Normal appearance.   HENT:      Head: Normocephalic and atraumatic.   Cardiovascular:      Rate and Rhythm: Regular rhythm.   Pulmonary:      Effort: Pulmonary effort is normal.      Breath sounds: Normal breath sounds.   Abdominal:      Comments: Right inguinal hernia noted, extends into upper scrotum, reduces    Mild swelling of right testicle noted    No left inguinal hernia   Musculoskeletal:      Cervical back: Normal range of motion.   Skin:     General: Skin is warm.   Neurological:      General: No focal deficit present.      Mental Status: He is alert and oriented to person, place, and time.                  Assessment/    Right inguinal hernia    Right hydrocele    Baseline health issues to include hypertension        Plan/    Note made of concomitant right inguinal hernia and hydrocele.    Patient is aware of the " hydrocele.  Potential of dealing with the hydrocele at the same time of the hernia surgery reviewed with the patient.  He is not interested.  Again urology consultation for the hydrocele has been offered the patient is not interested    Will proceed to open repair of the hernia at his convenience    Patient understands that hernia repair will have no impact on the hydrocele    Open repair of the inguinal hernia is reviewed.    The potential of bleeding, infection, MI, PE/DVT, death, etc. reviewed.  The anticipated convalescence is reviewed.  Use of mesh and prosthetic materials is reviewed.  The plan for postoperative pain management is reviewed.  All questions were answered and consent was obtained

## 2024-06-26 ENCOUNTER — ANESTHESIA EVENT (OUTPATIENT)
Dept: OPERATING ROOM | Facility: HOSPITAL | Age: 59
End: 2024-06-26
Payer: COMMERCIAL

## 2024-06-26 ENCOUNTER — HOSPITAL ENCOUNTER (OUTPATIENT)
Facility: HOSPITAL | Age: 59
Setting detail: OUTPATIENT SURGERY
Discharge: HOME | End: 2024-06-26
Attending: SURGERY | Admitting: SURGERY
Payer: COMMERCIAL

## 2024-06-26 ENCOUNTER — ANESTHESIA (OUTPATIENT)
Dept: OPERATING ROOM | Facility: HOSPITAL | Age: 59
End: 2024-06-26
Payer: COMMERCIAL

## 2024-06-26 VITALS
TEMPERATURE: 96.8 F | BODY MASS INDEX: 23.16 KG/M2 | RESPIRATION RATE: 18 BRPM | WEIGHT: 171 LBS | SYSTOLIC BLOOD PRESSURE: 143 MMHG | HEIGHT: 72 IN | OXYGEN SATURATION: 99 % | HEART RATE: 59 BPM | DIASTOLIC BLOOD PRESSURE: 72 MMHG

## 2024-06-26 DIAGNOSIS — K40.90 RIGHT INGUINAL HERNIA: Primary | ICD-10-CM

## 2024-06-26 PROCEDURE — 3600000008 HC OR TIME - EACH INCREMENTAL 1 MINUTE - PROCEDURE LEVEL THREE: Performed by: SURGERY

## 2024-06-26 PROCEDURE — 2780000003 HC OR 278 NO HCPCS: Performed by: SURGERY

## 2024-06-26 PROCEDURE — 49505 PRP I/HERN INIT REDUC >5 YR: CPT | Performed by: SURGERY

## 2024-06-26 PROCEDURE — 2500000004 HC RX 250 GENERAL PHARMACY W/ HCPCS (ALT 636 FOR OP/ED): Performed by: SURGERY

## 2024-06-26 PROCEDURE — 2500000005 HC RX 250 GENERAL PHARMACY W/O HCPCS: Performed by: NURSE ANESTHETIST, CERTIFIED REGISTERED

## 2024-06-26 PROCEDURE — 7100000001 HC RECOVERY ROOM TIME - INITIAL BASE CHARGE: Performed by: SURGERY

## 2024-06-26 PROCEDURE — 3700000001 HC GENERAL ANESTHESIA TIME - INITIAL BASE CHARGE: Performed by: SURGERY

## 2024-06-26 PROCEDURE — 2720000007 HC OR 272 NO HCPCS: Performed by: SURGERY

## 2024-06-26 PROCEDURE — 7100000010 HC PHASE TWO TIME - EACH INCREMENTAL 1 MINUTE: Performed by: SURGERY

## 2024-06-26 PROCEDURE — 3600000003 HC OR TIME - INITIAL BASE CHARGE - PROCEDURE LEVEL THREE: Performed by: SURGERY

## 2024-06-26 PROCEDURE — 3700000002 HC GENERAL ANESTHESIA TIME - EACH INCREMENTAL 1 MINUTE: Performed by: SURGERY

## 2024-06-26 PROCEDURE — 7100000002 HC RECOVERY ROOM TIME - EACH INCREMENTAL 1 MINUTE: Performed by: SURGERY

## 2024-06-26 PROCEDURE — C1781 MESH (IMPLANTABLE): HCPCS | Performed by: SURGERY

## 2024-06-26 PROCEDURE — 2500000004 HC RX 250 GENERAL PHARMACY W/ HCPCS (ALT 636 FOR OP/ED): Performed by: NURSE ANESTHETIST, CERTIFIED REGISTERED

## 2024-06-26 PROCEDURE — 2500000004 HC RX 250 GENERAL PHARMACY W/ HCPCS (ALT 636 FOR OP/ED): Mod: JZ | Performed by: SURGERY

## 2024-06-26 PROCEDURE — 2500000004 HC RX 250 GENERAL PHARMACY W/ HCPCS (ALT 636 FOR OP/ED): Performed by: STUDENT IN AN ORGANIZED HEALTH CARE EDUCATION/TRAINING PROGRAM

## 2024-06-26 PROCEDURE — 96372 THER/PROPH/DIAG INJ SC/IM: CPT | Performed by: SURGERY

## 2024-06-26 PROCEDURE — 7100000009 HC PHASE TWO TIME - INITIAL BASE CHARGE: Performed by: SURGERY

## 2024-06-26 DEVICE — BARD MESH
Type: IMPLANTABLE DEVICE | Site: INGUINAL | Status: FUNCTIONAL
Brand: BARD MESH

## 2024-06-26 RX ORDER — PROPOFOL 10 MG/ML
INJECTION, EMULSION INTRAVENOUS AS NEEDED
Status: DISCONTINUED | OUTPATIENT
Start: 2024-06-26 | End: 2024-06-26

## 2024-06-26 RX ORDER — LABETALOL HYDROCHLORIDE 5 MG/ML
5 INJECTION, SOLUTION INTRAVENOUS ONCE AS NEEDED
Status: ACTIVE | OUTPATIENT
Start: 2024-06-26

## 2024-06-26 RX ORDER — ONDANSETRON HYDROCHLORIDE 2 MG/ML
INJECTION, SOLUTION INTRAVENOUS AS NEEDED
Status: DISCONTINUED | OUTPATIENT
Start: 2024-06-26 | End: 2024-06-26

## 2024-06-26 RX ORDER — ONDANSETRON HYDROCHLORIDE 2 MG/ML
4 INJECTION, SOLUTION INTRAVENOUS ONCE AS NEEDED
Status: ACTIVE | OUTPATIENT
Start: 2024-06-26

## 2024-06-26 RX ORDER — ALBUTEROL SULFATE 0.83 MG/ML
2.5 SOLUTION RESPIRATORY (INHALATION) ONCE AS NEEDED
Status: ACTIVE | OUTPATIENT
Start: 2024-06-26

## 2024-06-26 RX ORDER — OXYCODONE HYDROCHLORIDE 5 MG/1
5 TABLET ORAL EVERY 4 HOURS PRN
Status: ACTIVE | OUTPATIENT
Start: 2024-06-26

## 2024-06-26 RX ORDER — ROCURONIUM BROMIDE 10 MG/ML
INJECTION, SOLUTION INTRAVENOUS AS NEEDED
Status: DISCONTINUED | OUTPATIENT
Start: 2024-06-26 | End: 2024-06-26

## 2024-06-26 RX ORDER — CEFAZOLIN SODIUM 2 G/100ML
2 INJECTION, SOLUTION INTRAVENOUS
Status: COMPLETED | OUTPATIENT
Start: 2024-06-26 | End: 2024-06-26

## 2024-06-26 RX ORDER — SODIUM CHLORIDE, SODIUM LACTATE, POTASSIUM CHLORIDE, CALCIUM CHLORIDE 600; 310; 30; 20 MG/100ML; MG/100ML; MG/100ML; MG/100ML
100 INJECTION, SOLUTION INTRAVENOUS CONTINUOUS
Status: ACTIVE | OUTPATIENT
Start: 2024-06-26

## 2024-06-26 RX ORDER — FENTANYL CITRATE 50 UG/ML
50 INJECTION, SOLUTION INTRAMUSCULAR; INTRAVENOUS EVERY 5 MIN PRN
Status: DISPENSED | OUTPATIENT
Start: 2024-06-26

## 2024-06-26 RX ORDER — LIDOCAINE HYDROCHLORIDE 10 MG/ML
0.1 INJECTION INFILTRATION; PERINEURAL ONCE
Status: ACTIVE | OUTPATIENT
Start: 2024-06-26

## 2024-06-26 RX ORDER — HYDRALAZINE HYDROCHLORIDE 20 MG/ML
5 INJECTION INTRAMUSCULAR; INTRAVENOUS EVERY 30 MIN PRN
Status: ACTIVE | OUTPATIENT
Start: 2024-06-26

## 2024-06-26 RX ORDER — LIDOCAINE HYDROCHLORIDE 20 MG/ML
INJECTION, SOLUTION EPIDURAL; INFILTRATION; INTRACAUDAL; PERINEURAL AS NEEDED
Status: DISCONTINUED | OUTPATIENT
Start: 2024-06-26 | End: 2024-06-26

## 2024-06-26 RX ORDER — MIDAZOLAM HYDROCHLORIDE 1 MG/ML
INJECTION, SOLUTION INTRAMUSCULAR; INTRAVENOUS AS NEEDED
Status: DISCONTINUED | OUTPATIENT
Start: 2024-06-26 | End: 2024-06-26

## 2024-06-26 RX ORDER — FENTANYL CITRATE 50 UG/ML
INJECTION, SOLUTION INTRAMUSCULAR; INTRAVENOUS AS NEEDED
Status: DISCONTINUED | OUTPATIENT
Start: 2024-06-26 | End: 2024-06-26

## 2024-06-26 RX ORDER — HEPARIN SODIUM 5000 [USP'U]/ML
5000 INJECTION, SOLUTION INTRAVENOUS; SUBCUTANEOUS
Status: COMPLETED | OUTPATIENT
Start: 2024-06-26 | End: 2024-06-26

## 2024-06-26 ASSESSMENT — PAIN - FUNCTIONAL ASSESSMENT
PAIN_FUNCTIONAL_ASSESSMENT: 0-10

## 2024-06-26 ASSESSMENT — PAIN SCALES - GENERAL
PAINLEVEL_OUTOF10: 3
PAINLEVEL_OUTOF10: 3
PAINLEVEL_OUTOF10: 4
PAINLEVEL_OUTOF10: 3
PAINLEVEL_OUTOF10: 4
PAINLEVEL_OUTOF10: 5 - MODERATE PAIN
PAINLEVEL_OUTOF10: 0 - NO PAIN

## 2024-06-26 NOTE — ANESTHESIA PROCEDURE NOTES
Airway  Date/Time: 6/26/2024 11:36 AM  Urgency: elective    Airway not difficult    Staffing  Performed: attending   Authorized by: Elisa Covington MD    Performed by: DOMO Aguilar-EDWINA  Patient location during procedure: OR    Indications and Patient Condition  Indications for airway management: anesthesia  Spontaneous ventilation: present  Sedation level: deep  Preoxygenated: yes  Patient position: sniffing  MILS maintained throughout  Mask difficulty assessment: 1 - vent by mask    Final Airway Details  Final airway type: endotracheal airway      Successful airway: ETT  Cuffed: yes   Successful intubation technique: direct laryngoscopy  Facilitating devices/methods: intubating stylet  Endotracheal tube insertion site: oral  Blade: Juan  ETT size (mm): 7.5  Cormack-Lehane Classification: grade I - full view of glottis  Placement verified by: chest auscultation and capnometry   Measured from: lips  Number of attempts at approach: 1  Ventilation between attempts: none  Number of other approaches attempted: 0

## 2024-06-26 NOTE — BRIEF OP NOTE
Date: 2024  OR Location: [facility] OR    Name: Darryl Berry, : 1965, Age: 58 y.o., MRN: [id number], Sex: male    Diagnosis  Pre-op Diagnosis     * Right inguinal hernia [K40.90] Post-op Diagnosis     * Right inguinal hernia [K40.90]     Procedures  Repair of right inguinal hernia  63517 - CA RPR 1ST INGUN HRNA AGE 5 YRS/> REDUCIBLE      Surgeons      * Juan Carlos Holcomb - Primary    Resident/Fellow/Other Assistant:  Surgeons and Role:  * No surgeons found with a matching role *    Procedure Summary  Anesthesia: General  ASA: II  Anesthesia Staff: Anesthesiologist: Elisa Covington MD  CRNA: DOMO Aguilar-CRNA  Estimated Blood Loss: Minimal large indirect sac mL  Intra-op Medications:   Administrations occurring from 1050 to 1215 on 24:   Medication Name Total Dose   ceFAZolin in dextrose (iso-os) (Ancef) IVPB 2 g 2 g              Anesthesia Record               Intraprocedure I/O Totals       None           Specimen:   ID Type Source Tests Collected by Time   1 : HERNIA SAC Tissue HERNIA SAC SURGICAL PATHOLOGY EXAM Juan Carlos Holcomb MD 2024 [id number]        Staff:   Joanneulator: Sherron  Circulator: Julio Valiente Person: Savi          Findings: Large indirect sac    Complications:  None; patient tolerated the procedure well.     Disposition: PACU - hemodynamically stable.  Condition: stable  Specimens Collected:   ID Type Source Tests Collected by Time   1 : HERNIA SAC Tissue HERNIA SAC SURGICAL PATHOLOGY EXAM Juan Carlos Holcomb MD 2024 [id number]     Attending Attestation: I performed the procedure.    Juan Carlos Holcomb  Phone Number: [phone]    used

## 2024-06-26 NOTE — OP NOTE
Repair of right inguinal hernia (R) Operative Note     Date: 2024  OR Location: [facility] OR    Name: Darryl Berry, : 1965, Age: 58 y.o., MRN: [id number], Sex: male    Diagnosis  Pre-op Diagnosis     * Right inguinal hernia [K40.90] Post-op Diagnosis     * Right inguinal hernia [K40.90]     Procedures  Repair of right inguinal hernia  37577 - MD RPR 1ST INGUN HRNA AGE 5 YRS/> REDUCIBLE      Surgeons      * Juan Carlos Holcomb - Primary    Resident/Fellow/Other Assistant:  Surgeons and Role:  * No surgeons found with a matching role *    Procedure Summary  Anesthesia: General  ASA: II  Anesthesia Staff: Anesthesiologist: Elisa Covington MD  CRNA: DOMO Aguilar-CRNA  Estimated Blood Loss: Minimal mL  Intra-op Medications:   Administrations occurring from 1050 to 1215 on 24:   Medication Name Total Dose   ceFAZolin in dextrose (iso-os) (Ancef) IVPB 2 g 2 g              Anesthesia Record               Intraprocedure I/O Totals       None           Specimen:   ID Type Source Tests Collected by Time   1 : HERNIA SAC Tissue HERNIA SAC SURGICAL PATHOLOGY EXAM Juan Carlos Holcomb MD 2024 1159        Staff:   Joanneulator: Sherron  Circulator: Julio Valiente Person: Savi         Drains and/or Catheters: * None in log *    Tourniquet Times:         Implants:  Implants       Type Name Action Serial No.      Surgical Mesh Sling Implant PATCH, MESH, MARLEX, 3 X 6 IN, POLYPROPYLENE - [id number] Implanted          58-year-old male presents for elective repair of right inguinal hernia.    Risk benefits indications for this reviewed with him.  The potential bleeding infection MI PE death etc. reviewed.  The anticipated convalescence is reviewed.  Use of mesh or prosthetic materials was reviewed.  All questions were answered and consent is obtained.    Patient is taken to the operating room placed on the table in supine position.  General esthesia obtained.  Right groin prepped and draped in a sterile fashion.   Timeout procedure followed.  Antibiotics were given.  DVT prophylaxis obtained using subcutaneous heparin and placement of external meta compression stockings    Standard right inguinal hernia incision is made and dissection carried down to the skin, subcutaneous tissues and Meggan's fascia to level the external oblique.  The external bleak was opened in direction of its fibers and the cord was mobilized.    Dissection was carried out there is a large indirect sac running for the entire length of the canal into the upper scrotum.   small cord structures    Note is made there is no significant lipoma.  There is no obvious direct defect    After  the sac from the cord structures it is opened and explored.  There is no sliding or incarcerated component.  Finger wound is injuries to the internal ring and the posterior floor palpated.  Again there is no femoral direct defect sac controlled with suture ligature and the redundant portion excised    A 3 x 6 inch piece of flat polypropylene mesh was brought onto the field.  Sinker the pubic tubercle using interrupted 0 Prolene.  Double-armed 2-0 Prolene is used to further anchor the mesh to the tubercle.  1 arm of the 2-0 Prolene used to anchor the mesh in a running fashion to the iliopubic tract and showing edge of the ligament.  Second arm of the 2-0 Prolene used to anchor the mesh to the lateral edge of the conjoined tendon the superior margin and transversalis arch.  Laterally a transverse slit was created the mesh shows allow passage of the cord and the nerve.  Defect in the mesh was tightened with several interrupted Prolene suture sutures.    Repair was inspected.  Hemostasis is good.  Counts reported as correct.  The external oblique was closed with 2-0 Vicryl.  Skin is closed with 4-0 Vicryl.    Patient tolerated procedure well                    Juan Carlos Holcomb  Phone Number: [phone]       Duration Of Freeze Thaw-Cycle (Seconds): 3 Number Of Freeze-Thaw Cycles: 2 freeze-thaw cycles Show Spray Paint Technique Variable?: Yes Detail Level: Detailed Render Note In Bullet Format When Appropriate: No Spray Paint Text: The liquid nitrogen was applied to the skin utilizing a spray paint frosting technique. Consent: The patient's consent was obtained including but not limited to risks of crusting, scabbing, blistering, scarring, darker or lighter pigmentary change, recurrence, incomplete removal and infection. Post-Care Instructions: I reviewed with the patient in detail post-care instructions. Patient is to wear sunprotection, and avoid picking at any of the treated lesions. Pt may apply Vaseline to crusted or scabbing areas. Medical Necessity Information: It is in your best interest to select a reason for this procedure from the list below. All of these items fulfill various CMS LCD requirements except the new and changing color options. Medical Necessity Clause: This procedure was medically necessary because the lesions that were treated were:

## 2024-06-26 NOTE — ANESTHESIA PREPROCEDURE EVALUATION
Patient: Darryl Berry    Procedure Information       Date/Time: 06/26/24 1050    Procedure: Repair of right inguinal hernia (Right)    Location: [facility] OR 02 / [facility] OR    Surgeons: Juan Carlos Holcomb MD            Relevant Problems   Anesthesia (within normal limits)      Digestive   (+) Right inguinal hernia       Clinical information reviewed:    Allergies  Meds               NPO Detail:  No data recorded     Physical Exam    Airway  Mallampati: II  TM distance: >3 FB  Neck ROM: full     Cardiovascular   Rhythm: regular  Rate: normal     Dental    Pulmonary   Breath sounds clear to auscultation     Abdominal   Abdomen: soft             Anesthesia Plan    History of general anesthesia?: yes  History of complications of general anesthesia?: no    ASA 2     general     intravenous induction   Postoperative administration of opioids is intended.  Anesthetic plan and risks discussed with patient.  Use of blood products discussed with patient who consented to blood products.    Plan discussed with CAA, CRNA and attending.

## 2024-06-26 NOTE — ANESTHESIA POSTPROCEDURE EVALUATION
Patient: Darryl Berry    Procedure Summary       Date: 06/26/24 Room / Location: [facility] OR 02 / [facility] OR    Anesthesia Start: 1127 Anesthesia Stop: 1234    Procedure: Repair of right inguinal hernia (Right: Abdomen) Diagnosis:       Right inguinal hernia      (Right inguinal hernia [K40.90])    Surgeons: Juan Carlos Holcomb MD Responsible Provider: Elisa Covington MD    Anesthesia Type: general ASA Status: 2            Anesthesia Type: general    Vitals Value Taken Time   /79 06/26/24 1330   Temp 36 °C (96.8 °F) 06/26/24 1330   Pulse 48 06/26/24 1333   Resp 26 06/26/24 1333   SpO2 98 % 06/26/24 1333   Vitals shown include unfiled device data.    Anesthesia Post Evaluation    Patient location during evaluation: PACU  Patient participation: complete - patient participated  Level of consciousness: awake and alert  Pain management: adequate  Airway patency: patent  Cardiovascular status: acceptable  Respiratory status: acceptable  Hydration status: acceptable  Postoperative Nausea and Vomiting: none        No notable events documented.

## 2024-07-09 ENCOUNTER — APPOINTMENT (OUTPATIENT)
Dept: SURGERY | Facility: CLINIC | Age: 59
End: 2024-07-09
Payer: COMMERCIAL

## 2024-07-09 VITALS
SYSTOLIC BLOOD PRESSURE: 126 MMHG | HEART RATE: 74 BPM | DIASTOLIC BLOOD PRESSURE: 75 MMHG | RESPIRATION RATE: 16 BRPM | OXYGEN SATURATION: 99 % | TEMPERATURE: 98 F

## 2024-07-09 DIAGNOSIS — K40.90 RIGHT INGUINAL HERNIA: Primary | ICD-10-CM

## 2024-07-09 PROCEDURE — 99024 POSTOP FOLLOW-UP VISIT: CPT | Performed by: SURGERY

## 2024-07-09 NOTE — LETTER
July 9, 2024     Patient: Darryl Berry   YOB: 1965   Date of Visit: 7/9/2024       To Whom It May Concern:    It is my medical opinion that Darryl Berry may return to work on Monday July 15th with restrictions of no more than 15 lbs. May return to full duty on July 29th without restrictions   .    If you have any questions or concerns, please don't hesitate to call.         Sincerely,        Juan Carlos Holcomb MD    CC: No Recipients

## 2024-07-09 NOTE — PROGRESS NOTES
"Darryl Berry   [id number]   1965     Chief Complaint/        Postop check        HPI/    58-year-old male status post 6/26/2024 repair of right inguinal hernia    Patient feels great.  Ambulates easily.  Minimal discomfort.  Performing household chores without difficulty.  GI and  function at baseline    Laboratory data:   CBC:   Lab Results   Component Value Date    WBC 4.9 06/12/2024    RBC 4.80 06/12/2024    HGB 13.8 06/12/2024    HCT 39.7 (L) 06/12/2024     06/12/2024   ]   [facility] Labs:   Lab Results   Component Value Date     06/12/2024    K 4.8 06/12/2024     06/12/2024    CO2 31 06/12/2024    BUN 20 06/12/2024    CREATININE 0.91 06/12/2024    CALCIUM 9.7 06/12/2024    PROT 7.7 04/25/2023    ALBUMIN 4.4 04/25/2023    BILITOT 0.6 04/25/2023    ALKPHOS 60 04/25/2023    AST 22 04/25/2023    ALT 28 04/25/2023      No results found for: \"FINALDX\"    ECG 12 lead (Ancillary Performed)  Normal sinus rhythm  Normal ECG  No previous ECGs available       /75 (BP Location: Right arm, Patient Position: Sitting, BP Cuff Size: Adult)   Pulse 74   Temp 36.7 °C (98 °F) (Temporal)   Resp 16   SpO2 99%      Physical Exam  Abdominal:      Comments: The right groin wound is clean and dry.  No redness drainage or swelling                Assessment/    Doing well      Plan/    Will return to work on light duty basis on Monday    RTC 6 weeks for final check    "

## 2024-07-11 LAB
LABORATORY COMMENT REPORT: NORMAL
PATH REPORT.FINAL DX SPEC: NORMAL
PATH REPORT.GROSS SPEC: NORMAL
PATH REPORT.RELEVANT HX SPEC: NORMAL
PATH REPORT.TOTAL CANCER: NORMAL

## 2024-08-20 ENCOUNTER — APPOINTMENT (OUTPATIENT)
Dept: SURGERY | Facility: CLINIC | Age: 59
End: 2024-08-20
Payer: COMMERCIAL

## 2025-05-05 ENCOUNTER — TELEPHONE (OUTPATIENT)
Dept: PRIMARY CARE | Facility: CLINIC | Age: 60
End: 2025-05-05
Payer: COMMERCIAL

## 2025-05-05 DIAGNOSIS — I10 PRIMARY HYPERTENSION: ICD-10-CM

## 2025-05-05 RX ORDER — LISINOPRIL 10 MG/1
10 TABLET ORAL DAILY
Qty: 90 TABLET | Refills: 3 | Status: SHIPPED | OUTPATIENT
Start: 2025-05-05

## 2025-05-05 NOTE — TELEPHONE ENCOUNTER
Rx Refill Request Telephone Encounter    Name:  Darryl Berry  :  [id number]  Medication Name:  lisinopril  Dose: 10 mg   Route: oral   Frequency: Daily  Dispense Quantity: 90 tablet (90 day supply)   Refills: 3   Duration: --   Dispense As Written: Yes        Sig: Take 1 tablet (10 mg) by mouth once daily.    Last refill: 24    Specific Pharmacy location:  [facility] [address] [place] [address]    Date of last appointment:  24  Date of next appointment:  25  Best number to reach patient:  [phone]

## 2025-08-08 ENCOUNTER — APPOINTMENT (OUTPATIENT)
Dept: PRIMARY CARE | Facility: CLINIC | Age: 60
End: 2025-08-08
Payer: COMMERCIAL

## 2025-08-08 VITALS
BODY MASS INDEX: 23.03 KG/M2 | WEIGHT: 170 LBS | DIASTOLIC BLOOD PRESSURE: 68 MMHG | TEMPERATURE: 97.2 F | SYSTOLIC BLOOD PRESSURE: 118 MMHG | HEIGHT: 72 IN

## 2025-08-08 DIAGNOSIS — Z12.11 ENCOUNTER FOR SCREENING FOR MALIGNANT NEOPLASM OF COLON: ICD-10-CM

## 2025-08-08 DIAGNOSIS — Z00.00 GENERAL MEDICAL EXAM: Primary | ICD-10-CM

## 2025-08-08 PROCEDURE — 3008F BODY MASS INDEX DOCD: CPT | Performed by: FAMILY MEDICINE

## 2025-08-08 PROCEDURE — 99396 PREV VISIT EST AGE 40-64: CPT | Performed by: FAMILY MEDICINE

## 2025-08-08 PROCEDURE — 1036F TOBACCO NON-USER: CPT | Performed by: FAMILY MEDICINE

## 2025-08-08 ASSESSMENT — ENCOUNTER SYMPTOMS
FATIGUE: 0
WEAKNESS: 0
ARTHRALGIAS: 0
FEVER: 0
DIARRHEA: 0
NUMBNESS: 0
SORE THROAT: 0
CHILLS: 0
SLEEP DISTURBANCE: 1
VOICE CHANGE: 0
TROUBLE SWALLOWING: 0
CONSTIPATION: 0
SHORTNESS OF BREATH: 0
VOMITING: 0
MYALGIAS: 0
ABDOMINAL PAIN: 0
COUGH: 0
WOUND: 0
DYSURIA: 0
BLOOD IN STOOL: 0
HEMATURIA: 0
BACK PAIN: 0
HEADACHES: 0
SINUS PAIN: 0
FREQUENCY: 0
DYSPHORIC MOOD: 1
NAUSEA: 0
DIZZINESS: 0
WHEEZING: 0
PALPITATIONS: 0
ROS SKIN COMMENTS: NO MOLES GROWING OR CHANGING.
NERVOUS/ANXIOUS: 1
ADENOPATHY: 0
RHINORRHEA: 0

## 2025-08-08 ASSESSMENT — PROMIS GLOBAL HEALTH SCALE
EMOTIONAL_PROBLEMS: SOMETIMES
RATE_AVERAGE_PAIN: 2
RATE_AVERAGE_FATIGUE: MODERATE
RATE_GENERAL_HEALTH: GOOD
RATE_SOCIAL_SATISFACTION: GOOD
RATE_PHYSICAL_HEALTH: GOOD
CARRYOUT_PHYSICAL_ACTIVITIES: COMPLETELY
RATE_QUALITY_OF_LIFE: GOOD
CARRYOUT_SOCIAL_ACTIVITIES: GOOD
RATE_MENTAL_HEALTH: GOOD

## 2025-08-08 ASSESSMENT — PATIENT HEALTH QUESTIONNAIRE - PHQ9
2. FEELING DOWN, DEPRESSED OR HOPELESS: NOT AT ALL
1. LITTLE INTEREST OR PLEASURE IN DOING THINGS: NOT AT ALL
SUM OF ALL RESPONSES TO PHQ9 QUESTIONS 1 AND 2: 0

## 2025-08-08 NOTE — PATIENT INSTRUCTIONS
I will order labs to be done fasting at [address]  I ordered a Cologuard test as your schedule is too busy to get a colonoscopy, which is the best way to screen for colon cancer.  Return in one year for a general physical and sooner if you have any new or worsen problems. Continue with regular exercise.  You decline medical treatment for depression or anxiety at this time, which is fine.

## 2025-08-08 NOTE — PROGRESS NOTES
"Subjective   Patient ID: [id number]     Darryl Berry is a 59 y.o. male who presents for Annual Exam (Not Fasting) and Varicose Veins (Left calf.).  HPI    He is here for a general medical examination    He remains on lisinopril for HTN.      He had inguinal hernia surgery last summer.  He feels like it is weak in the same area, \"like I am going to have another hernia\".      No surgery or hospitalizations over the last year otherwise.     Medications Ordered Prior to Encounter[1]    Tobacco Use: Low Risk  (8/8/2025)    Patient History     Smoking Tobacco Use: Never     Smokeless Tobacco Use: Never     Passive Exposure: Not on file   Drinks alcohol very occasionally.  No drug use.   Exercises regularly.  Weights six or seven times a week.  Cardio every day.  Job--physically active.    Family History[2]  No family history of cancer or heart disease.    Never had a colonoscopy.       Review of Systems   Constitutional:  Negative for chills, fatigue and fever.   HENT:  Negative for congestion, rhinorrhea, sinus pain, sore throat, trouble swallowing and voice change.    Respiratory:  Negative for cough, shortness of breath and wheezing.    Cardiovascular:  Negative for chest pain, palpitations and leg swelling (significant varicose veins.).   Gastrointestinal:  Negative for abdominal pain, blood in stool, constipation, diarrhea, nausea and vomiting.   Genitourinary:  Negative for dysuria, frequency, hematuria, scrotal swelling and testicular pain.   Musculoskeletal:  Negative for arthralgias, back pain and myalgias.   Skin:  Negative for rash and wound.        No moles growing or changing.   Neurological:  Negative for dizziness, syncope, weakness, numbness and headaches.   Hematological:  Negative for adenopathy.   Psychiatric/Behavioral:  Positive for dysphoric mood (some in general.  \"nothing major\".) and sleep disturbance (occasionally at times.). Negative for self-injury and suicidal ideas. The patient is " "nervous/anxious.          Objective     /68 (BP Location: Left arm, Patient Position: Sitting)   Temp 36.2 °C (97.2 °F) (Skin)   Ht 1.816 m (5' 11.5\")   Wt 77.1 kg (170 lb)   BMI 23.38 kg/m²      Physical Exam  Vitals reviewed.   Constitutional:       General: He is not in acute distress.     Appearance: Normal appearance. He is not ill-appearing or toxic-appearing.   HENT:      Head: Normocephalic and atraumatic.      Right Ear: Tympanic membrane, ear canal and external ear normal.      Left Ear: Tympanic membrane, ear canal and external ear normal.      Nose: Nose normal.      Mouth/Throat:      Mouth: Mucous membranes are moist.     Eyes:      Extraocular Movements: Extraocular movements intact.      Conjunctiva/sclera: Conjunctivae normal.      Pupils: Pupils are equal, round, and reactive to light.       Cardiovascular:      Rate and Rhythm: Normal rate and regular rhythm.      Heart sounds: No murmur heard.  Pulmonary:      Effort: Pulmonary effort is normal.      Breath sounds: Normal breath sounds.   Abdominal:      General: Bowel sounds are normal. There is no distension.      Palpations: Abdomen is soft. There is no mass.      Tenderness: There is no abdominal tenderness. There is no guarding or rebound.     Musculoskeletal:         General: No tenderness.      Cervical back: Neck supple.      Right lower leg: No edema.      Left lower leg: No edema.     Skin:     Coloration: Skin is not jaundiced or pale.      Findings: No rash.     Neurological:      General: No focal deficit present.      Mental Status: He is alert and oriented to person, place, and time. Mental status is at baseline.     Psychiatric:         Mood and Affect: Mood normal.         Behavior: Behavior normal.         Thought Content: Thought content normal.         Judgment: Judgment normal.         Assessment/Plan   Problem List Items Addressed This Visit    None  Visit Diagnoses         General medical exam    -  Primary    " Relevant Orders    Urinalysis with Reflex Microscopic    Hemoglobin A1C    Lipid Panel    Thyroid Stimulating Hormone    Comprehensive Metabolic Panel    CBC and Auto Differential    Prostate Specific Antigen      Encounter for screening for malignant neoplasm of colon        Relevant Orders    Cologuard® colon cancer screening          I will order labs to be done fasting at [address]  I ordered a Cologuard test as your schedule is too busy to get a colonoscopy, which is the best way to screen for colon cancer.  Return in one year for a general physical and sooner if you have any new or worsen problems. Continue with regular exercise.  You decline medical treatment for depression or anxiety at this time, which is fine.   Ralph Sher DO          [1]   Current Outpatient Medications on File Prior to Visit   Medication Sig Dispense Refill    ascorbic acid (Vitamin C) 500 mg tablet Take 1 tablet (500 mg) by mouth once daily.      cholecalciferol (Vitamin D-3) 25 MCG (1000 UT) capsule Take 1 capsule (25 mcg) by mouth once daily.      cyanocobalamin (Vitamin B-12) 500 mcg tablet Take 1 tablet (500 mcg) by mouth once daily.      ferrous sulfate 325 (65 Fe) MG tablet Take 1 tablet by mouth once daily with breakfast.      lisinopril 10 mg tablet Take 1 tablet (10 mg) by mouth once daily. 90 tablet 3    multivitamin tablet Take 1 tablet by mouth once daily.      [DISCONTINUED] chlorhexidine (Peridex) 0.12 % solution Swish and spit 15 ml for 2 doses, 15mL the night before surgery and 15 ml morning of surgery - swish for 30 seconds -DO NOT SWALLOW, SPIT  mL 0    [DISCONTINUED] oxyCODONE-acetaminophen (Percocet) 5-325 mg tablet Take 1 tablet by mouth every 6 hours if needed for severe pain (7 - 10). 10 tablet 0     No current facility-administered medications on file prior to visit.   [2]   Family History  Problem Relation Name Age of Onset    Hypertension Mother      Hypertension Father      Hypertension  Brother      Hypertension Brother Josafat Berry

## 2025-08-13 LAB
ALBUMIN SERPL-MCNC: 4.4 G/DL (ref 3.6–5.1)
ALP SERPL-CCNC: 61 U/L (ref 35–144)
ALT SERPL-CCNC: 26 U/L (ref 9–46)
ANION GAP SERPL CALCULATED.4IONS-SCNC: 8 MMOL/L (CALC) (ref 7–17)
APPEARANCE UR: CLEAR
AST SERPL-CCNC: 21 U/L (ref 10–35)
BACTERIA #/AREA URNS HPF: ABNORMAL /HPF
BASOPHILS # BLD AUTO: 51 CELLS/UL (ref 0–200)
BASOPHILS NFR BLD AUTO: 1.7 %
BILIRUB SERPL-MCNC: 0.6 MG/DL (ref 0.2–1.2)
BILIRUB UR QL STRIP: NEGATIVE
BUN SERPL-MCNC: 18 MG/DL (ref 7–25)
CALCIUM SERPL-MCNC: 9.5 MG/DL (ref 8.6–10.3)
CHLORIDE SERPL-SCNC: 103 MMOL/L (ref 98–110)
CHOLEST SERPL-MCNC: 146 MG/DL
CHOLEST/HDLC SERPL: 2.9 (CALC)
CO2 SERPL-SCNC: 28 MMOL/L (ref 20–32)
COLOR UR: YELLOW
CREAT SERPL-MCNC: 0.81 MG/DL (ref 0.7–1.3)
EGFRCR SERPLBLD CKD-EPI 2021: 103 ML/MIN/1.73M2
EOSINOPHIL # BLD AUTO: 99 CELLS/UL (ref 15–500)
EOSINOPHIL NFR BLD AUTO: 3.3 %
ERYTHROCYTE [DISTWIDTH] IN BLOOD BY AUTOMATED COUNT: 12.7 % (ref 11–15)
EST. AVERAGE GLUCOSE BLD GHB EST-MCNC: 103 MG/DL
EST. AVERAGE GLUCOSE BLD GHB EST-SCNC: 5.7 MMOL/L
GLUCOSE SERPL-MCNC: 94 MG/DL (ref 65–99)
GLUCOSE UR QL STRIP: NEGATIVE
HBA1C MFR BLD: 5.2 %
HCT VFR BLD AUTO: 42.6 % (ref 38.5–50)
HDLC SERPL-MCNC: 51 MG/DL
HGB BLD-MCNC: 14.1 G/DL (ref 13.2–17.1)
HGB UR QL STRIP: NEGATIVE
HYALINE CASTS #/AREA URNS LPF: ABNORMAL /LPF
KETONES UR QL STRIP: NEGATIVE
LDLC SERPL CALC-MCNC: 81 MG/DL (CALC)
LEUKOCYTE ESTERASE UR QL STRIP: ABNORMAL
LYMPHOCYTES # BLD AUTO: 741 CELLS/UL (ref 850–3900)
LYMPHOCYTES NFR BLD AUTO: 24.7 %
MCH RBC QN AUTO: 30.2 PG (ref 27–33)
MCHC RBC AUTO-ENTMCNC: 33.1 G/DL (ref 32–36)
MCV RBC AUTO: 91.2 FL (ref 80–100)
MONOCYTES # BLD AUTO: 351 CELLS/UL (ref 200–950)
MONOCYTES NFR BLD AUTO: 11.7 %
NEUTROPHILS # BLD AUTO: 1758 CELLS/UL (ref 1500–7800)
NEUTROPHILS NFR BLD AUTO: 58.6 %
NITRITE UR QL STRIP: NEGATIVE
NONHDLC SERPL-MCNC: 95 MG/DL (CALC)
PH UR STRIP: 7.5 [PH] (ref 5–8)
PLATELET # BLD AUTO: 188 THOUSAND/UL (ref 140–400)
PMV BLD REES-ECKER: 9.8 FL (ref 7.5–12.5)
POTASSIUM SERPL-SCNC: 4.5 MMOL/L (ref 3.5–5.3)
PROT SERPL-MCNC: 7.4 G/DL (ref 6.1–8.1)
PROT UR QL STRIP: NEGATIVE
PSA SERPL-MCNC: 0.43 NG/ML
RBC # BLD AUTO: 4.67 MILLION/UL (ref 4.2–5.8)
RBC #/AREA URNS HPF: ABNORMAL /HPF
SERVICE CMNT-IMP: ABNORMAL
SODIUM SERPL-SCNC: 139 MMOL/L (ref 135–146)
SP GR UR STRIP: 1 (ref 1–1.03)
SQUAMOUS #/AREA URNS HPF: ABNORMAL /HPF
TRIGL SERPL-MCNC: 48 MG/DL
TSH SERPL-ACNC: 1.89 MIU/L (ref 0.4–4.5)
WBC # BLD AUTO: 3 THOUSAND/UL (ref 3.8–10.8)
WBC #/AREA URNS HPF: ABNORMAL /HPF

## 2025-08-14 DIAGNOSIS — D72.810 LYMPHOPENIA: Primary | ICD-10-CM

## 2025-08-19 LAB — NONINV COLON CA DNA+OCC BLD SCRN STL QL: NEGATIVE

## 2025-08-28 DIAGNOSIS — D72.810 LYMPHOPENIA: ICD-10-CM

## (undated) DEVICE — DRAPE, SHEET, ENDOSCOPY, GENERAL, FENESTRATED, ARMBOARD COVER, 98 X 123.5 IN, DISPOSABLE, LF, STERILE

## (undated) DEVICE — Device

## (undated) DEVICE — SPONGE, DISSECTOR, PEANUT, 3/8, STERILE 5 FOAM HOLDER"

## (undated) DEVICE — PREP TRAY, VAGINAL

## (undated) DEVICE — SOLUTION, IRRIGATION, SODIUM CHLORIDE 0.9%, 1000 ML, POUR BOTTLE

## (undated) DEVICE — SUTURE, PROLENE, 0, 30 IN, CT-2, BLUE

## (undated) DEVICE — TOWEL PACK, STERILE, 4/PACK, BLUE

## (undated) DEVICE — SUTURE, VICRYL, 3-0, 27 IN, CT-1, UNDYED

## (undated) DEVICE — SUTURE, PROLENE, 2-0, 36 IN, SH, DA, BLUE

## (undated) DEVICE — DRAIN, PENROSE, 0.25 X 12 IN

## (undated) DEVICE — DRESSING, GAUZE, SUPER KERLIX, 6X6

## (undated) DEVICE — SUTURE, VICRYL PLUS, 4-0, 27 IN, PS-2

## (undated) DEVICE — STAPLER, SKIN, PLUS, WIDE, 35

## (undated) DEVICE — PREP, SKIN, BACTOSHEILD, 4%, 4OZ

## (undated) DEVICE — GLOVE, SURGICAL, PROTEXIS PI , 7.5, PF, LF

## (undated) DEVICE — SUTURE, VICRYL, 3-0, 27 IN, SH

## (undated) DEVICE — SUTURE, VICRYL, 2-0, 18 IN, UNDYED

## (undated) DEVICE — SOLUTION, IRRIGATION, STERILE WATER, 1000 ML, POUR BOTTLE

## (undated) DEVICE — SUTURE, VICRYL 2-0, TAPER POINT, CT-1 UNDYED 27 INCH